# Patient Record
Sex: MALE | Race: WHITE | HISPANIC OR LATINO | Employment: FULL TIME | URBAN - METROPOLITAN AREA
[De-identification: names, ages, dates, MRNs, and addresses within clinical notes are randomized per-mention and may not be internally consistent; named-entity substitution may affect disease eponyms.]

---

## 2018-08-23 ENCOUNTER — HOSPITAL ENCOUNTER (OUTPATIENT)
Facility: MEDICAL CENTER | Age: 38
End: 2018-08-23
Attending: ORTHOPAEDIC SURGERY | Admitting: ORTHOPAEDIC SURGERY
Payer: COMMERCIAL

## 2018-08-23 VITALS
DIASTOLIC BLOOD PRESSURE: 71 MMHG | OXYGEN SATURATION: 94 % | HEIGHT: 72 IN | BODY MASS INDEX: 32.37 KG/M2 | TEMPERATURE: 98.3 F | WEIGHT: 238.98 LBS | RESPIRATION RATE: 16 BRPM | HEART RATE: 70 BPM | SYSTOLIC BLOOD PRESSURE: 136 MMHG

## 2018-08-23 PROCEDURE — 501838 HCHG SUTURE GENERAL: Performed by: ORTHOPAEDIC SURGERY

## 2018-08-23 PROCEDURE — 160025 RECOVERY II MINUTES (STATS): Performed by: ORTHOPAEDIC SURGERY

## 2018-08-23 PROCEDURE — 160035 HCHG PACU - 1ST 60 MINS PHASE I: Performed by: ORTHOPAEDIC SURGERY

## 2018-08-23 PROCEDURE — 700102 HCHG RX REV CODE 250 W/ 637 OVERRIDE(OP)

## 2018-08-23 PROCEDURE — 160046 HCHG PACU - 1ST 60 MINS PHASE II: Performed by: ORTHOPAEDIC SURGERY

## 2018-08-23 PROCEDURE — A9270 NON-COVERED ITEM OR SERVICE: HCPCS

## 2018-08-23 PROCEDURE — 160022 HCHG BLOCK: Performed by: ORTHOPAEDIC SURGERY

## 2018-08-23 PROCEDURE — A6454 SELF-ADHER BAND W>=3" <5"/YD: HCPCS | Performed by: ORTHOPAEDIC SURGERY

## 2018-08-23 PROCEDURE — 700101 HCHG RX REV CODE 250

## 2018-08-23 PROCEDURE — 160028 HCHG SURGERY MINUTES - 1ST 30 MINS LEVEL 3: Performed by: ORTHOPAEDIC SURGERY

## 2018-08-23 PROCEDURE — 500881 HCHG PACK, EXTREMITY: Performed by: ORTHOPAEDIC SURGERY

## 2018-08-23 PROCEDURE — 160002 HCHG RECOVERY MINUTES (STAT): Performed by: ORTHOPAEDIC SURGERY

## 2018-08-23 PROCEDURE — 700111 HCHG RX REV CODE 636 W/ 250 OVERRIDE (IP)

## 2018-08-23 PROCEDURE — 160036 HCHG PACU - EA ADDL 30 MINS PHASE I: Performed by: ORTHOPAEDIC SURGERY

## 2018-08-23 PROCEDURE — 160048 HCHG OR STATISTICAL LEVEL 1-5: Performed by: ORTHOPAEDIC SURGERY

## 2018-08-23 PROCEDURE — 160039 HCHG SURGERY MINUTES - EA ADDL 1 MIN LEVEL 3: Performed by: ORTHOPAEDIC SURGERY

## 2018-08-23 PROCEDURE — 160009 HCHG ANES TIME/MIN: Performed by: ORTHOPAEDIC SURGERY

## 2018-08-23 RX ORDER — ACETAMINOPHEN 500 MG
TABLET ORAL
Status: COMPLETED
Start: 2018-08-23 | End: 2018-08-23

## 2018-08-23 RX ORDER — OXYCODONE HCL 5 MG/5 ML
SOLUTION, ORAL ORAL
Status: COMPLETED
Start: 2018-08-23 | End: 2018-08-23

## 2018-08-23 RX ORDER — SODIUM CHLORIDE, SODIUM LACTATE, POTASSIUM CHLORIDE, CALCIUM CHLORIDE 600; 310; 30; 20 MG/100ML; MG/100ML; MG/100ML; MG/100ML
INJECTION, SOLUTION INTRAVENOUS CONTINUOUS
Status: DISCONTINUED | OUTPATIENT
Start: 2018-08-23 | End: 2018-08-23 | Stop reason: HOSPADM

## 2018-08-23 RX ORDER — CELECOXIB 200 MG/1
CAPSULE ORAL
Status: COMPLETED
Start: 2018-08-23 | End: 2018-08-23

## 2018-08-23 RX ORDER — MAGNESIUM HYDROXIDE 1200 MG/15ML
LIQUID ORAL
Status: COMPLETED | OUTPATIENT
Start: 2018-08-23 | End: 2018-08-23

## 2018-08-23 RX ORDER — FENOFIBRATE 160 MG/1
160 TABLET ORAL
COMMUNITY

## 2018-08-23 RX ORDER — LIDOCAINE HYDROCHLORIDE 10 MG/ML
0.5 INJECTION, SOLUTION INFILTRATION; PERINEURAL
Status: DISCONTINUED | OUTPATIENT
Start: 2018-08-23 | End: 2018-08-23 | Stop reason: HOSPADM

## 2018-08-23 RX ADMIN — ACETAMINOPHEN 1000 MG: 500 TABLET, FILM COATED ORAL at 10:39

## 2018-08-23 RX ADMIN — OXYCODONE HYDROCHLORIDE 10 MG: 5 SOLUTION ORAL at 13:27

## 2018-08-23 RX ADMIN — SODIUM CHLORIDE, SODIUM LACTATE, POTASSIUM CHLORIDE, CALCIUM CHLORIDE: 600; 310; 30; 20 INJECTION, SOLUTION INTRAVENOUS at 10:36

## 2018-08-23 RX ADMIN — CELECOXIB 400 MG: 200 CAPSULE ORAL at 10:39

## 2018-08-23 ASSESSMENT — PAIN SCALES - GENERAL
PAINLEVEL_OUTOF10: 0
PAINLEVEL_OUTOF10: 1
PAINLEVEL_OUTOF10: 0
PAINLEVEL_OUTOF10: 0

## 2018-08-23 NOTE — OR NURSING
Pt's VSS; denies N/V; states pain is at tolerable level. Dressing CDI to RLE. D/c orders received. IV dc'd. Pt changed into clothing with assistance. Pt up and ambulated to BR, steady gait, voided adequately. Discharge instructions given; pt and family verbalized understanding and questions answered. Patient states ready to d/c home. Prescriptions given. Pt dc'd in w/c with RN in stable condition.

## 2018-08-23 NOTE — PROGRESS NOTES
The Medication Reconciliation process has been completed by interviewing the patient and calling his pharmacy for dosing info    Allergies have been reviewed  Antibiotic use in 30 days -none     Home Pharmacy:  [facility] [place]

## 2018-08-23 NOTE — OR NURSING
1300: received to PACU via gurney. Sleepy. Respirations spontaneous. Dressing to right lower leg CDI. Elevated and iced. Brisk capillary refill. Warm to touch.  1323: sips of water given. Tolerated well. Able to wiggle right toes.  1430. Still denies any pain. Taking fluids well, denies any nausea.   1440:report called to Denisse PÉREZ.  1443: transported via [place] to PACU 2. Stable.

## 2018-08-23 NOTE — CONSULTS
8/23/2018    Reyes Hilario Osorio is a 38 y.o. male who presents with a nonhealing ulcer left second toe with exposed bone.  Erythema improved with antibiotics, second toe remains necrotic.  Flexible hammering other lesser toes.      History reviewed. No pertinent past medical history.    History reviewed. No pertinent surgical history.    Medications  No current facility-administered medications on file prior to encounter.      No current outpatient prescriptions on file prior to encounter.       Allergies  Patient has no known allergies.    ROS  All other systems were reviewed and found to be negative    History reviewed. No pertinent family history.    Social History     Social History   • Marital status:      Spouse name: N/A   • Number of children: N/A   • Years of education: N/A     Social History Main Topics   • Smoking status: Never Smoker   • Smokeless tobacco: Never Used   • Alcohol use No   • Drug use: No   • Sexual activity: Not on file     Other Topics Concern   • Not on file     Social History Narrative   • No narrative on file       Physical Exam  Vitals  Blood pressure 125/84, pulse 87, temperature 36.6 °C (97.9 °F), resp. rate (!) 23, height 1.829 m (6'), weight 108.4 kg (238 lb 15.7 oz), SpO2 98 %.  General: Well Developed, Well Nourished, no acute distress  HEENT: Normocephalic, atraumatic  Eyes: Anicteric, PERRLA, EOMI  LLE: Necrotic second toe with ulcer at tip, bone palpable.  Dec sens stocking dist to above ankle.  Palpable PT and DP pulses.  Flexible hammering 3/4/5 toes.    Radiographs:  No orders to display       Laboratory Values      No results for input(s): SODIUM, POTASSIUM, CHLORIDE, CO2, GLUCOSE, BUN, CPKTOTAL in the last 72 hours.          Impression: Left nonhealing second toe ulcer with exposed bone, flexible hammering 3/4/5 toes.    Plan:    NPO for Left second toe amp, 3/4/5 perc flexor tenotomies  Patient in agreement with plan  WBAT LLE in shoe postop    Giuseppe Millan  MD

## 2018-08-23 NOTE — OP REPORT
DATE OF SERVICE:  08/23/2018    PREOPERATIVE DIAGNOSIS:  Right Achilles tendon rupture.    POSTOPERATIVE DIAGNOSIS:  Right Achilles tendon rupture.    PROCEDURE:  Open right Achilles tendon repair.    SURGEON:  Giuseppe Millan MD    ASSISTANT:  Dalia Mcmahon.    ANESTHESIA:  General with peripheral nerve block requested by me for   postoperative pain control.    ESTIMATED BLOOD LOSS:  10 mL.    TOURNIQUET TIME:  16 minutes at 250 mmHg.    FINDINGS:  Complete rupture 7 cm proximal to the insertion.    COMPLICATIONS:  None.    OUTCOME:  PACU in stable condition.    HISTORY OF PRESENT ILLNESS:  This is a pleasant 38-year-old gentleman who was   running and felt a pop in the posterior aspect of his right leg.  He did   report some mild antecedent discomfort.  For this, we ordered an MRI.  It   showed mild tendinopathy and a complete rupture 7 cm proximal to the   insertion.  We did discuss operative and nonoperative management.  He elected   to proceed with surgical repair.  He was greeted in the preoperative holding   area and identified by name and medical record number.  The right lower   extremity was marked.  Risks of the procedure including bleeding, infection,   pain, re-rupture, neurovascular damage, need for more surgery were discussed.    He provided a written consent.    DESCRIPTION OF PROCEDURE:  He was taken to the operating room where general   anesthesia was induced on the gurney.  Nonsterile tourniquet was placed on his   right thigh.  He was then placed prone and in supine position on the   operating room table with all bony prominences well padded.  Preoperative   antibiotics were administered.  His right lower extremity prepped and draped   in the usual sterile fashion.  An operative pause was undertaken where all   present were in agreement with patient identification, laterality and   procedure to be performed.  An Esmarch bandage was used to exsanguinate the   limb and tourniquet was raised to 250  mmHg.  A 4 cm longitudinal incision was   made, posterior midline centered over a palpable defect in the tendon.  Sharp   dissection carried through peritenon.  A complete rupture was seen.  The   proximal stump was pulled into the incision and a Taylor elevator was used for   lysis of adhesions.  The same was performed with the distal stump.  The edges   were frayed.  There was adequate tissue for repair.  An Arthrex SutureTape   suture was placed central anterior in modified Bennett-Ant fashion through the   proximal stump done in matching form in the distal stump.  There was adequate   purchase on the tendon to oppose the ends with 5 degrees greater resting   tension on the operative side than the nonoperative side.  We then augmented   this repair with a medial followed by a lateral SutureTape suture in modified   Mead fashion for a total of 6 crossing strands and 3 knots.  Gant test   now negative, ends nicely opposed.  We performed an epitendinous 3-0 Monocryl   stitch.  The incision was copiously irrigated.  Peritenon layer closed with   3-0 Vicryl in a buried interrupted fashion.  Skin closed with 3-0 nylon in   horizontal mattress fashion.  Xeroform gauze and a well-padded posterior   splint with a stirrup were placed.  Tourniquet let down for a total time of 16   minutes.  The patient sore awakened and extubated in stable condition.    POSTOPERATIVE COURSE:  He will discharge home today assuming adequate pain   control and mobilization.  Nonweightbearing right lower extremity.  Resume   aspirin 81 mg twice daily for DVT prophylaxis.  We will see him in 10-14 days   for suture removal and wound check and progression of weightbearing.  We will   begin physical therapy at that time.       ____________________________________     MD JUANCARLOS MONTES DE OCA / ESTEPHANIA    DD:  08/23/2018 12:53:09  DT:  08/23/2018 13:39:29    D#:  [id number]  Job#:  [id number]

## 2018-08-23 NOTE — OR SURGEON
Immediate Post OP Note    PreOp Diagnosis: Right Achilles tendon rupture    PostOp Diagnosis: Same    Procedure(s):  ACHILLES TENDON REPAIR - Wound Class: Clean    Surgeon(s):  Giuseppe Millan M.D.    Anesthesiologist/Type of Anesthesia:  Anesthesiologist: Jose Lofton/General    Surgical Staff:  Circulator: Alanna Agudelo R.N.  Relief Circulator: Yodit Saunders R.N.  Scrub Person: Bianca Crump    Specimens removed if any:  * No specimens in log *    Estimated Blood Loss: 10cc    TT: 16 min @ 250mmHg    Findings: Complete rupture 7cm prox to insertion    Complications: None        8/23/2018 12:48 PM Giuseppe Millan M.D.

## 2018-08-23 NOTE — DISCHARGE INSTRUCTIONS
ACTIVITY: Rest and take it easy for the first 24 hours.  A responsible adult is recommended to remain with you during that time.  It is normal to feel sleepy.  We encourage you to not do anything that requires balance, judgment or coordination.    MILD FLU-LIKE SYMPTOMS ARE NORMAL. YOU MAY EXPERIENCE GENERALIZED MUSCLE ACHES, THROAT IRRITATION, HEADACHE AND/OR SOME NAUSEA.    FOR 24 HOURS DO NOT:  Drive, operate machinery or run household appliances.  Drink beer or alcoholic beverages.   Make important decisions or sign legal documents.    SPECIAL INSTRUCTIONS:   Non-Weight Bearing to Right Leg  Ice and elevate  Stay ahead of pain  Keep splint clean and dry  Aspirin 81mg two times daily for blood clot prevention    Achilles Tendon Repair, Care After  Refer to this sheet in the next few weeks. These instructions provide you with information on caring for yourself after your procedure. Your health care provider may also give you more specific instructions. Your treatment has been planned according to current medical practices, but problems sometimes occur. Call your health care provider if you have any problems or questions after your procedure.  WHAT TO EXPECT AFTER THE PROCEDURE  · Your lower leg may feel numb for several hours.  · You may feel pain when the numbing medicine wears off.  · Most people can start to walk normally in about 6 weeks. It may be 6 months before you can do all your regular activities. Complete recovery can take a year or longer.  HOME CARE INSTRUCTIONS  · Take pain medicines as directed by your health care provider.  · Do not take over-the-counter medicines unless your health care provider says it is okay.  · Do not walk on or put weight on your injured leg.  · Keep your cast or splint dry while bathing.  · Use crutches or another walking aid.  · When you are resting, keep your leg above the level of your heart.  · Go back to your health care provider about 2 weeks after surgery to have  your splint or cast removed. If you have stitches, your health care provider will also take them out during this time.  · After your cast or splint is removed, your health care provider will give you instructions on how to move your ankle and how much weight you can put on your leg. Follow your health care provider's instructions.  · See a physical therapist if directed by your health care provider. A physical therapist can help you regain ankle motion and strengthen your leg muscles.  · Keep all follow-up appointments.  SEEK MEDICAL CARE IF:   · You have chills.  · You have a fever.  · Your pain medicine is not working.  · You have persistent numbness or burning.  SEEK IMMEDIATE MEDICAL CARE IF:   · You have pain or numbness that is getting worse.  · You are unable to move your toes.  · You are bleeding through your cast or splint.  · You notice redness, swelling, bleeding, or discharge near your cut (incision) after your cast or splint has been removed.  · You notice warmth, swelling, or tenderness in the back of your lower leg.  · You have chest pain.  · You have trouble breathing.  MAKE SURE YOU:  · Understand these instructions.  · Will watch your condition.  · Will get help right away if you are not doing well or get worse.       DIET: To avoid nausea, slowly advance diet as tolerated, avoiding spicy or greasy foods for the first day.  Add more substantial food to your diet according to your physician's instructions.  INCREASE FLUIDS AND FIBER TO AVOID CONSTIPATION.    SURGICAL DRESSING/BATHING:   Keep splint clean and dry and intact until follow-up appointment  No submerging in water until follow-up    FOLLOW-UP APPOINTMENT:  A follow-up appointment should be arranged with your doctor in 1-2 weeks; call to schedule.    You should CALL YOUR PHYSICIAN if you develop:  Fever greater than 101 degrees F.  Pain not relieved by medication, or persistent nausea or vomiting.  Excessive bleeding (blood soaking through  dressing) or unexpected drainage from the wound.  Extreme redness or swelling around the incision site, drainage of pus or foul smelling drainage.  Inability to urinate or empty your bladder within 8 hours.  Problems with breathing or chest pain.    You should call 911 if you develop problems with breathing or chest pain.  If you are unable to contact your doctor or surgical center, you should go to the nearest emergency room or urgent care center.  Physician's telephone #: Dr. Millan [phone]    If any questions arise, call your doctor.  If your doctor is not available, please feel free to call the Surgical Center at [phone].  The Center is open Monday through Friday from 7AM to 7PM.  You can also call the [facility] HOTLINE open 24 hours/day, 7 days/week and speak to a nurse at [phone], or toll free at [phone].    A registered nurse may call you a few days after your surgery to see how you are doing after your procedure.    MEDICATIONS: Resume taking daily medication.  Take prescribed pain medication with food.  If no medication is prescribed, you may take non-aspirin pain medication if needed.  PAIN MEDICATION CAN BE VERY CONSTIPATING.  Take a stool softener or laxative such as senokot, pericolace, or milk of magnesia if needed.    Prescription given to wife.  Last pain medication given at 1:27pm.    If your physician has prescribed pain medication that includes Acetaminophen (Tylenol), do not take additional Acetaminophen (Tylenol) while taking the prescribed medication.    Depression / Suicide Risk    As you are discharged from this [facility] facility, it is important to learn how to keep safe from harming yourself.    Recognize the warning signs:  · Abrupt changes in personality, positive or negative- including increase in energy   · Giving away possessions  · Change in eating patterns- significant weight changes-  positive or negative  · Change in sleeping patterns- unable to sleep or  sleeping all the time   · Unwillingness or inability to communicate  · Depression  · Unusual sadness, discouragement and loneliness  · Talk of wanting to die  · Neglect of personal appearance   · Rebelliousness- reckless behavior  · Withdrawal from people/activities they love  · Confusion- inability to concentrate     If you or a loved one observes any of these behaviors or has concerns about self-harm, here's what you can do:  · Talk about it- your feelings and reasons for harming yourself  · Remove any means that you might use to hurt yourself (examples: pills, rope, extension cords, firearm)  · Get professional help from the community (Mental Health, Substance Abuse, psychological counseling)  · Do not be alone:Call your Safe Contact- someone whom you trust who will be there for you.  · Call your local CRISIS HOTLINE 271-6070 or 924-053-4341  · Call your local Children's Mobile Crisis Response Team [place] (572) 228-3934 or www.Datto  · Call the toll free National Suicide Prevention Hotlines   · National Suicide Prevention Lifeline 029-749-DQHQ (4670)  · National Hope Line Network 800-SUICIDE (948-9691)

## 2020-09-11 ENCOUNTER — APPOINTMENT (RX ONLY)
Dept: URBAN - METROPOLITAN AREA CLINIC 22 | Facility: CLINIC | Age: 40
Setting detail: DERMATOLOGY
End: 2020-09-11

## 2020-09-11 DIAGNOSIS — B36.0 PITYRIASIS VERSICOLOR: ICD-10-CM | Status: INADEQUATELY CONTROLLED

## 2020-09-11 PROCEDURE — ? COUNSELING

## 2020-09-11 PROCEDURE — 99203 OFFICE O/P NEW LOW 30 MIN: CPT

## 2020-09-11 PROCEDURE — ? ADDITIONAL NOTES

## 2020-09-11 PROCEDURE — ? PRESCRIPTION

## 2020-09-11 RX ORDER — FLUCONAZOLE 200 MG/1
1 TABLET ORAL QD
Qty: 2 | Refills: 1 | Status: ERX | COMMUNITY
Start: 2020-09-11

## 2020-09-11 RX ORDER — KETOCONAZOLE 20 MG/ML
1 SHAMPOO, SUSPENSION TOPICAL BIW
Qty: 2 | Refills: 3 | Status: ERX | COMMUNITY
Start: 2020-09-11

## 2020-09-11 RX ADMIN — FLUCONAZOLE 1: 200 TABLET ORAL at 00:00

## 2020-09-11 RX ADMIN — KETOCONAZOLE 1: 20 SHAMPOO, SUSPENSION TOPICAL at 00:00

## 2020-09-11 ASSESSMENT — LOCATION ZONE DERM: LOCATION ZONE: TRUNK

## 2020-09-11 ASSESSMENT — LOCATION SIMPLE DESCRIPTION DERM
LOCATION SIMPLE: LEFT UPPER BACK
LOCATION SIMPLE: CHEST

## 2020-09-11 ASSESSMENT — LOCATION DETAILED DESCRIPTION DERM
LOCATION DETAILED: LEFT SUPERIOR LATERAL UPPER BACK
LOCATION DETAILED: STERNUM

## 2020-09-11 NOTE — PROCEDURE: ADDITIONAL NOTES
Detail Level: Simple
Additional Notes: Diffuse abdomen, check, neck and groin involvement. \\nDiscussed some oral treatment as well as ketoconazole shampoo.

## (undated) DEVICE — GLOVE BIOGEL SZ 6.5 SURGICAL PF LTX (50PR/BX 4BX/CA)

## (undated) DEVICE — SET EXTENSION WITH 2 PORTS (48EA/CA) ***PART #2C8610 IS A SUBSTITUTE*****

## (undated) DEVICE — SPLINT PLASTER 5 IN X 30 IN - (50EA/BX 6BX/CA)

## (undated) DEVICE — GLOVE BIOGEL INDICATOR SZ 8.5 SURGICAL PF LTX - (50/BX 4BX/CA)

## (undated) DEVICE — GLOVE SZ 6.5 BIOGEL PI MICRO - PF LF (50PR/BX)

## (undated) DEVICE — CHLORAPREP 26 ML APPLICATOR - ORANGE TINT(25/CA)

## (undated) DEVICE — TUBE E-T HI-LO CUFF 7.0MM (10EA/PK)

## (undated) DEVICE — BLADE SURGICAL #15 - (50/BX 3BX/CA)

## (undated) DEVICE — GLOVE BIOGEL INDICATOR SZ 6.5 SURGICAL PF LTX - (50PR/BX 4BX/CA)

## (undated) DEVICE — TUBING CLEARLINK DUO-VENT - C-FLO (48EA/CA)

## (undated) DEVICE — SLEEVE, VASO, THIGH, MED

## (undated) DEVICE — ELECTRODE 850 FOAM ADHESIVE - HYDROGEL RADIOTRNSPRNT (50/PK)

## (undated) DEVICE — GOWN WARMING STANDARD FLEX - (30/CA)

## (undated) DEVICE — PACK LOWER EXTREMITY - (2/CA)

## (undated) DEVICE — HEAD HOLDER JUNIOR/ADULT

## (undated) DEVICE — SUTURE 0 VICRYL PLUS CT-1 - 8 X 18 INCH (12/BX)

## (undated) DEVICE — STOCKINET BIAS 6 IN STERILE - (20/CA)

## (undated) DEVICE — SUTURE 3-0 ETHILON FS-1 - (36/BX) 30 INCH

## (undated) DEVICE — KIT ANESTHESIA W/CIRCUIT & 3/LT BAG W/FILTER (20EA/CA)

## (undated) DEVICE — LACTATED RINGERS INJ 1000 ML - (14EA/CA 60CA/PF)

## (undated) DEVICE — SUTURE 3-0 MONOCRYL PLUS PS-1 - 27 INCH (36/BX)

## (undated) DEVICE — KIT ROOM DECONTAMINATION

## (undated) DEVICE — GLOVE BIOGEL ECLIPSE PF LATEX SIZE 8.0  (50PR/BX)

## (undated) DEVICE — PADDING CAST 6 IN STERILE - 6 X 4 YDS (24/CA)

## (undated) DEVICE — SUCTION INSTRUMENT YANKAUER BULBOUS TIP W/O VENT (50EA/CA)

## (undated) DEVICE — GLOVE BIOGEL PI INDICATOR SZ 7.0 SURGICAL PF LF - (50/BX 4BX/CA)

## (undated) DEVICE — WRAP CO-FLEX 4IN X 5YD STERIL - SELF-ADHERENT (18/CA)

## (undated) DEVICE — NEPTUNE 4 PORT MANIFOLD - (20/PK)

## (undated) DEVICE — PROTECTOR ULNA NERVE - (36PR/CA)

## (undated) DEVICE — BLOCK

## (undated) DEVICE — MASK ANESTHESIA ADULT  - (100/CA)

## (undated) DEVICE — CANISTER SUCTION 3000ML MECHANICAL FILTER AUTO SHUTOFF MEDI-VAC NONSTERILE LF DISP  (40EA/CA)

## (undated) DEVICE — SENSOR SPO2 NEO LNCS ADHESIVE (20/BX) SEE USER NOTES

## (undated) DEVICE — SET LEADWIRE 5 LEAD BEDSIDE DISPOSABLE ECG (1SET OF 5/EA)

## (undated) DEVICE — SUTURE 3-0 VICRYL PLUS SH - 8X 18 INCH (12/BX)

## (undated) DEVICE — ELECTRODE DUAL RETURN W/ CORD - (50/PK)

## (undated) DEVICE — TOURNIQUET CUFF 34 X 4 ONE PORT DISP - STERILE (10/BX)

## (undated) DEVICE — PAD PREP 24 X 48 CUFFED - (100/CA)

## (undated) DEVICE — SUTURE GENERAL

## (undated) DEVICE — SODIUM CHL IRRIGATION 0.9% 1000ML (12EA/CA)